# Patient Record
Sex: MALE | Race: WHITE | NOT HISPANIC OR LATINO | Employment: OTHER | URBAN - NONMETROPOLITAN AREA
[De-identification: names, ages, dates, MRNs, and addresses within clinical notes are randomized per-mention and may not be internally consistent; named-entity substitution may affect disease eponyms.]

---

## 2023-02-13 ENCOUNTER — TRANSCRIBE ORDERS (OUTPATIENT)
Dept: GENERAL RADIOLOGY | Facility: HOSPITAL | Age: 73
End: 2023-02-13
Payer: MEDICARE

## 2023-02-13 ENCOUNTER — HOSPITAL ENCOUNTER (OUTPATIENT)
Dept: GENERAL RADIOLOGY | Facility: HOSPITAL | Age: 73
Discharge: HOME | End: 2023-02-13
Admitting: INTERNAL MEDICINE
Payer: MEDICARE

## 2023-02-13 DIAGNOSIS — M79.651 RIGHT THIGH PAIN: Primary | ICD-10-CM

## 2023-02-13 DIAGNOSIS — M79.651 RIGHT THIGH PAIN: ICD-10-CM

## 2023-02-13 PROCEDURE — 73552 X-RAY EXAM OF FEMUR 2/>: CPT

## 2023-04-17 ENCOUNTER — OFFICE VISIT (OUTPATIENT)
Dept: ORTHOPEDIC SURGERY | Facility: CLINIC | Age: 73
End: 2023-04-17
Payer: MEDICARE

## 2023-04-17 VITALS
DIASTOLIC BLOOD PRESSURE: 84 MMHG | WEIGHT: 180.6 LBS | TEMPERATURE: 98 F | HEIGHT: 69 IN | SYSTOLIC BLOOD PRESSURE: 122 MMHG | BODY MASS INDEX: 26.75 KG/M2

## 2023-04-17 DIAGNOSIS — M16.11 PRIMARY OSTEOARTHRITIS OF RIGHT HIP: ICD-10-CM

## 2023-04-17 DIAGNOSIS — S76.311A STRAIN OF RIGHT HAMSTRING MUSCLE, INITIAL ENCOUNTER: Primary | ICD-10-CM

## 2023-04-17 RX ORDER — ATORVASTATIN CALCIUM 80 MG/1
80 TABLET, FILM COATED ORAL
COMMUNITY
Start: 2023-02-28

## 2023-04-17 RX ORDER — LISINOPRIL 10 MG/1
1.5 TABLET ORAL DAILY
COMMUNITY
Start: 2023-03-22

## 2023-04-17 NOTE — PROGRESS NOTES
Subjective   Patient ID: Dave Ruby is a 73 y.o. left hand dominant  male referred by Neftaly Wang MD and is being seen for orthopaedic evaluation today for right thigh hamstring pain and sports injury.  Pain of the Right Leg (Referred by Neftaly Wang MD for right thigh pain for about 7 months)       CHIEF COMPLAINT:    Right thigh pain    History of Present Illness    Right thigh muscle pain with reported posterior hamstring strain playing racquetball six months ago. He just started back playing [employer], he is wearing pressure wrap on thigh and feels this helps. 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 He wants to make sure his thigh is healthy enough to start training. He and his primary care provider were just concerned about the delayed healing and how long it was taking for pain to go away. He also plays golf regularly.  Past orthopaedic history notable for right knee arthroscopy in 1994 in [place].      Past Medical History:   Diagnosis Date   • Colon polyp 2017   • Hyperlipidemia    • Hypertension         Past Surgical History:   Procedure Laterality Date   • ACHILLES TENDON REPAIR Right 1996    Dr. Jesus Muhammad [place]   • CATARACT EXTRACTION Bilateral 05/2022   • COLONOSCOPY  2017   • INGUINAL HERNIA REPAIR Right     at 5 years old   • KNEE ARTHROSCOPY Right 1994    Dr. Jesus Muhammad [place]   • TONSILLECTOMY      childhood       Family History   Problem Relation Age of Onset   • Peripheral vascular disease Mother    • Heart failure Father    • Colon cancer Neg Hx         Social History     Socioeconomic History   • Marital status: Single   Tobacco Use   • Smoking status: Never   • Smokeless tobacco: Never   Vaping Use   • Vaping Use: Never used   Substance and Sexual Activity   • Alcohol use: Yes     Comment: SOCIAL   • Drug use: Never   • Sexual activity: Defer       No Known  "Allergies    Review of Systems   Constitutional: Negative for fever.   Musculoskeletal: Negative for arthralgias, gait problem and joint swelling.   Skin: Negative for color change and rash.   Neurological: Negative for weakness.   Psychiatric/Behavioral: Negative for confusion.     I have reviewed the medical and surgical history, family history, social history, medications, and/or allergies, and the review of systems of this report.    Objective   /84   Temp 98 °F (36.7 °C)   Ht 175.3 cm (69\")   Wt 81.9 kg (180 lb 9.6 oz)   BMI 26.67 kg/m²   Physical Exam  Vitals reviewed.   Constitutional:       General: He is not in acute distress.     Appearance: He is well-developed.   Musculoskeletal:      Right knee: No effusion.      Instability Tests: Medial Datne test negative.   Skin:     General: Skin is warm and dry.      Findings: No erythema or rash.   Neurological:      Mental Status: He is alert.      Gait: Gait is intact.   Psychiatric:         Speech: Speech normal.       Right Knee Exam     Tenderness   The patient is experiencing tenderness in the medial hamstring.    Range of Motion   Extension: 0   Flexion: 130     Tests   Dante:  Medial - negative   Varus: negative   Drawer:  Anterior - 1+    Posterior - 1+  Patellar apprehension: negative    Other   Erythema: absent  Sensation: normal  Pulse: present  Effusion: no effusion present      Right Hip Exam     Tenderness   The patient is experiencing no tenderness.     Range of Motion   Flexion: 110   External rotation: 30   Internal rotation: 20     Muscle Strength   Abduction: 5/5   Adduction: 5/5   Flexion: 5/5     Tests   PEYTON: negative  Fadir:  Negative FADIR test        Extremity DVT signs are negative on physical exam with negative Antonella sign and no calf pain   Neurologic Exam     Mental Status   Attention: normal.   Speech: speech is normal   Level of consciousness: alert  Knowledge: good.     Motor Exam   Overall muscle tone: " normal    Gait, Coordination, and Reflexes     Gait  Gait: normal       Assessment & Plan     Independent Review of Radiographic Studies:    Reviewed relevant prior imaging with patient again today.   Four views (2 AP and 2 lateral) of the right femur from 2- are reviewed.  No fracture or dislocation seen. There is moderate to advanced right hip osteoarthritis.    Laboratory and Other Studies:  No new results reviewed today.     Medical Decision Making:    Stable neurovascular exam.  Fair progress with residual symptoms of recovering right hamstring strain.   Continue care plan with any additional work-up and treatment as outlined in recommendations.  Physical and occupational therapy planned.  Gradual return to exercise and sports (racquetball, golf) activities as appropriate.    Procedures    Diagnoses and all orders for this visit:    1. Strain of right hamstring muscle, initial encounter (Primary)    2. Primary osteoarthritis of right hip       Discussion of orthopaedic goals and activities and patient expressed appreciation.  Risk, benefits, and merits of treatment alternatives reviewed with the patient and questions answered  Regular exercise as tolerated  Guided on proper techniques for mobility, strength, agility and/or conditioning exercises  Ice, heat, and/or modalities as beneficial  Home exercise program ongoing    Recommendations/Plan:  Exercise, medications, injections, other patient advice, and return appointment as noted.  Brace: No brace was given at today's visit.  Referral: No referrals made at today's visit.  Test/Studies: No additional studies ordered at this time. Consider MRI or other imaging depending on clinical progress.  Surgery: Plan non-surgical treatment for current orthopaedic condition.  Work/Activity Status: Usual activities, routine exercise as tolerated, routine physical work as tolerated.    No follow-ups on file.  Patient is encouraged and agreeable to call or return  sooner for any issues or concerns.

## 2023-04-26 ENCOUNTER — OFFICE VISIT (OUTPATIENT)
Dept: GASTROENTEROLOGY | Facility: CLINIC | Age: 73
End: 2023-04-26
Payer: MEDICARE

## 2023-04-26 VITALS
WEIGHT: 178 LBS | OXYGEN SATURATION: 99 % | BODY MASS INDEX: 26.29 KG/M2 | SYSTOLIC BLOOD PRESSURE: 138 MMHG | DIASTOLIC BLOOD PRESSURE: 80 MMHG | HEART RATE: 62 BPM

## 2023-04-26 DIAGNOSIS — Z86.010 PERSONAL HISTORY OF COLONIC POLYPS: Primary | ICD-10-CM

## 2023-04-26 PROBLEM — N53.9 MALE SEXUAL DYSFUNCTION: Status: ACTIVE | Noted: 2023-04-26

## 2023-04-26 PROBLEM — Z86.69 HISTORY OF CATARACT: Status: ACTIVE | Noted: 2023-04-26

## 2023-04-26 PROBLEM — E78.00 HYPERCHOLESTEROLEMIA: Status: ACTIVE | Noted: 2023-04-26

## 2023-04-26 PROBLEM — I10 ESSENTIAL HYPERTENSION: Status: ACTIVE | Noted: 2023-04-26

## 2023-04-26 PROBLEM — R13.10 DYSPHAGIA: Status: ACTIVE | Noted: 2023-04-26

## 2023-04-26 RX ORDER — SODIUM CHLORIDE 9 MG/ML
70 INJECTION, SOLUTION INTRAVENOUS CONTINUOUS PRN
OUTPATIENT
Start: 2023-04-26

## 2023-04-26 RX ORDER — PEG-3350, SODIUM SULFATE, SODIUM CHLORIDE, POTASSIUM CHLORIDE, SODIUM ASCORBATE AND ASCORBIC ACID 7.5-2.691G
1000 KIT ORAL TAKE AS DIRECTED
Qty: 1 EACH | Refills: 0 | Status: SHIPPED | OUTPATIENT
Start: 2023-04-26

## 2023-04-26 RX ORDER — UBIDECARENONE 100 MG
100 CAPSULE ORAL DAILY
COMMUNITY

## 2023-04-26 RX ORDER — NIACINAMIDE 500 MG
500 TABLET ORAL 2 TIMES DAILY WITH MEALS
COMMUNITY

## 2023-04-26 NOTE — PROGRESS NOTES
New Patient Consult      Date: 2023   Patient Name: Dave Ruby  MRN: [id number]  : 1950     Primary Care Provider: Neftaly Wang MD    Chief Complaint   Patient presents with   • Colon Cancer Screening     History of Present Illness: Dave Ruby is a 72 y.o. male who is here today to establish care with gastroenterology for colon cancer screening.     The patient denies recent change in bowel habits. There is no diarrhea or constipation. There is no history of abdominal pain. There is no history of overt GI bleed (hematemesis melena or hematochezia). The patient denies nausea or vomiting. There is no history of reflux. The patient denies dysphagia or odynophagia. There is no history of recent significant weight loss. There is no history of liver disease in the past. There is no family history of GI malignancy. The patient's last colonoscopy was in 2017 with polyps removed.     Subjective      Past Medical History:   Diagnosis Date   • Colon polyp    • Hyperlipidemia    • Hypertension      Past Surgical History:   Procedure Laterality Date   • ACHILLES TENDON REPAIR Right     Dr. Jesus Muhammad [place]   • CATARACT EXTRACTION Bilateral 2022   • COLONOSCOPY  2017   • INGUINAL HERNIA REPAIR Right     at 5 years old   • KNEE ARTHROSCOPY Right     Dr. Jesus Caldwell   • TONSILLECTOMY      childhood     Family History   Problem Relation Age of Onset   • Peripheral vascular disease Mother    • Heart failure Father    • Colon cancer Neg Hx         Social History     Socioeconomic History   • Marital status: Single   Tobacco Use   • Smoking status: Never   • Smokeless tobacco: Never   Vaping Use   • Vaping Use: Never used   Substance and Sexual Activity   • Alcohol use: Yes     Comment: SOCIAL   • Drug use: Never   • Sexual activity: Defer       Current Outpatient Medications:   •  atorvastatin (LIPITOR) 80 MG tablet, Take 1 tablet  by mouth every night at bedtime., Disp: , Rfl:   •  coenzyme Q10 100 MG capsule, Take 1 capsule by mouth Daily., Disp: , Rfl:   •  lisinopril (PRINIVIL,ZESTRIL) 10 MG tablet, Take 1.5 tablets by mouth Daily., Disp: , Rfl:   •  niacinamide 500 MG tablet, Take 1 tablet by mouth 2 (Two) Times a Day With Meals., Disp: , Rfl:   •  Selenium 100 MCG capsule, Take  by mouth., Disp: , Rfl:   •  PEG-KCl-NaCl-NaSulf-Na Asc-C (MOVIPREP) 100 g reconstituted solution powder, Take 1,000 mL by mouth Take As Directed. Take as directed for colonoscopy prep., Disp: 1 each, Rfl: 0     No Known Allergies     The following portions of the patient's history were reviewed and updated as appropriate: allergies, current medications, past family history, past medical history, past social history, past surgical history and problem list.    Objective     Physical Exam  Vitals and nursing note reviewed.   Constitutional:       General: He is not in acute distress.     Appearance: Normal appearance. He is well-developed.   HENT:      Head: Normocephalic and atraumatic.      Mouth/Throat:      Mouth: Mucous membranes are not pale, not dry and not cyanotic.   Eyes:      General: Lids are normal.   Neck:      Trachea: Trachea normal.   Cardiovascular:      Rate and Rhythm: Normal rate.   Pulmonary:      Effort: Pulmonary effort is normal. No respiratory distress.      Breath sounds: Normal breath sounds.   Abdominal:      Tenderness: There is no abdominal tenderness.   Skin:     General: Skin is warm and dry.   Neurological:      Mental Status: He is alert and oriented to person, place, and time.   Psychiatric:         Mood and Affect: Mood normal.         Speech: Speech normal.         Behavior: Behavior normal. Behavior is cooperative.       Vitals:    04/26/23 0900   BP: 138/80   Pulse: 62   SpO2: 99%   Weight: 80.7 kg (178 lb)     Body mass index is 26.29 kg/m².     Results Review:   I have reviewed the patient's new clinical and imaging  results.    No visits with results within 90 Day(s) from this visit.   Latest known visit with results is:   No results found for any previous visit.      XR Femur 2 View Right     Result Date: 2/13/2023  No acute fracture.  Mild right hip joint space narrowing.       Assessment / Plan      1. Personal history of colonic polyps  The patient's last colonoscopy was in 2017 by Dr. Saxena in [facility] with polyp removed.  There is no family history of colon cancer.  Colonoscopy for surveillance.    - Case Request  - PEG-KCl-NaCl-NaSulf-Na Asc-C (MOVIPREP) 100 g reconstituted solution powder; Take 1,000 mL by mouth Take As Directed. Take as directed for colonoscopy prep.  Dispense: 1 each; Refill: 0    Patient Instructions   1. Colonoscopy: The indications, technique, alternatives and potential risk and complications were discussed with the patient including but not limited to bleeding, perforations, missing lesions and anesthetic complications. The patient understands and wishes to proceed with the procedure and has given their verbal consent. Written patient education information was given to the patient.   2. The patient will call if they have further questions before procedure.       Gina Brennan, APRN  4/26/2023    Please note that portions of this note may have been completed with a voice recognition program.

## 2023-06-06 ENCOUNTER — TELEPHONE (OUTPATIENT)
Dept: GASTROENTEROLOGY | Facility: CLINIC | Age: 73
End: 2023-06-06
Payer: MEDICARE

## 2023-06-06 NOTE — TELEPHONE ENCOUNTER
Called patient re: confirmation of appt for procedure (colonoscopy) on 06/12/2023.  Left voicemail for patient to call back to confirm.  Confirmation can be left with the HUB.

## 2023-06-08 NOTE — PRE-PROCEDURE INSTRUCTIONS
PAT phone history completed with pt for upcoming procedure on      PAT PASS GIVEN/REVIEWED WITH PT.  VERBALIZED UNDERSTANDING OF THE FOLLOWING:  DO NOT EAT, DRINK, SMOKE, USE SMOKELESS TOBACCO OR CHEW GUM AFTER MIDNIGHT THE NIGHT BEFORE SURGERY.  THIS ALSO INCLUDES HARD CANDIES AND MINTS.    DO NOT SHAVE THE AREA TO BE OPERATED ON AT LEAST 48 HOURS PRIOR TO THE PROCEDURE.  DO NOT WEAR MAKE UP OR NAIL POLISH.  DO NOT LEAVE IN ANY PIERCING OR WEAR JEWELRY THE DAY OF SURGERY.      DO NOT USE ADHESIVES IF YOU WEAR DENTURES.    DO NOT WEAR EYE CONTACTS; BRING IN YOUR GLASSES.    ONLY TAKE MEDICATION THE MORNING OF YOUR PROCEDURE IF INSTRUCTED BY YOUR SURGEON WITH ENOUGH WATER TO SWALLOW THE MEDICATION.  IF YOUR SURGEON DID NOT SPECIFY WHICH MEDICATIONS TO TAKE, YOU WILL NEED TO CALL THEIR OFFICE FOR FURTHER INSTRUCTIONS AND DO AS THEY INSTRUCT.    LEAVE ANYTHING YOU CONSIDER VALUABLE AT HOME.    YOU WILL NEED TO ARRANGE FOR SOMEONE TO DRIVE YOU HOME AFTER SURGERY.  IT IS RECOMMENDED THAT YOU DO NOT DRIVE, WORK, DRINK ALCOHOL OR MAKE MAJOR DECISIONS FOR AT LEAST 24 HOURS AFTER YOUR PROCEDURE IS COMPLETE.      THE DAY OF YOUR PROCEDURE, BRING IN THE FOLLOWING IF APPLICABLE:   PICTURE ID AND INSURANCE/MEDICARE OR MEDICAID CARDS/ANY CO-PAY THAT MAY BE DUE   COPY OF ADVANCED DIRECTIVE/LIVING WILL/POWER OR    CPAP/BIPAP/INHALERS   SKIN PREP SHEET   YOUR PREADMISSION TESTING PASS (IF NOT A PHONE HISTORY)

## 2023-06-12 ENCOUNTER — HOSPITAL ENCOUNTER (OUTPATIENT)
Facility: HOSPITAL | Age: 73
Setting detail: HOSPITAL OUTPATIENT SURGERY
Discharge: HOME | End: 2023-06-12
Attending: INTERNAL MEDICINE | Admitting: INTERNAL MEDICINE
Payer: MEDICARE

## 2023-06-12 ENCOUNTER — ANESTHESIA (OUTPATIENT)
Dept: GASTROENTEROLOGY | Facility: HOSPITAL | Age: 73
End: 2023-06-12
Payer: MEDICARE

## 2023-06-12 ENCOUNTER — ANESTHESIA EVENT (OUTPATIENT)
Dept: GASTROENTEROLOGY | Facility: HOSPITAL | Age: 73
End: 2023-06-12
Payer: MEDICARE

## 2023-06-12 VITALS
HEART RATE: 54 BPM | RESPIRATION RATE: 18 BRPM | HEIGHT: 70 IN | BODY MASS INDEX: 25.48 KG/M2 | DIASTOLIC BLOOD PRESSURE: 72 MMHG | TEMPERATURE: 97.4 F | SYSTOLIC BLOOD PRESSURE: 156 MMHG | OXYGEN SATURATION: 97 % | WEIGHT: 178 LBS

## 2023-06-12 DIAGNOSIS — Z86.010 PERSONAL HISTORY OF COLONIC POLYPS: ICD-10-CM

## 2023-06-12 PROCEDURE — 25010000002 PROPOFOL 200 MG/20ML EMULSION: Performed by: NURSE ANESTHETIST, CERTIFIED REGISTERED

## 2023-06-12 PROCEDURE — 45385 COLONOSCOPY W/LESION REMOVAL: CPT | Performed by: INTERNAL MEDICINE

## 2023-06-12 RX ORDER — LIDOCAINE HYDROCHLORIDE 20 MG/ML
INJECTION, SOLUTION INTRAVENOUS AS NEEDED
Status: DISCONTINUED | OUTPATIENT
Start: 2023-06-12 | End: 2023-06-12 | Stop reason: SURG

## 2023-06-12 RX ORDER — SODIUM CHLORIDE 9 MG/ML
INJECTION, SOLUTION INTRAVENOUS CONTINUOUS PRN
Status: DISCONTINUED | OUTPATIENT
Start: 2023-06-12 | End: 2023-06-12 | Stop reason: SURG

## 2023-06-12 RX ORDER — SIMETHICONE 20 MG/.3ML
EMULSION ORAL AS NEEDED
Status: DISCONTINUED | OUTPATIENT
Start: 2023-06-12 | End: 2023-06-12 | Stop reason: HOSPADM

## 2023-06-12 RX ORDER — PROPOFOL 10 MG/ML
INJECTION, EMULSION INTRAVENOUS AS NEEDED
Status: DISCONTINUED | OUTPATIENT
Start: 2023-06-12 | End: 2023-06-12 | Stop reason: SURG

## 2023-06-12 RX ORDER — SODIUM CHLORIDE 9 MG/ML
70 INJECTION, SOLUTION INTRAVENOUS CONTINUOUS PRN
Status: DISCONTINUED | OUTPATIENT
Start: 2023-06-12 | End: 2023-06-12 | Stop reason: HOSPADM

## 2023-06-12 RX ADMIN — LIDOCAINE HYDROCHLORIDE 40 MG: 20 INJECTION, SOLUTION INTRAVENOUS at 11:03

## 2023-06-12 RX ADMIN — SODIUM CHLORIDE 70 ML/HR: 9 INJECTION, SOLUTION INTRAVENOUS at 10:44

## 2023-06-12 RX ADMIN — SODIUM CHLORIDE: 9 INJECTION, SOLUTION INTRAVENOUS at 10:56

## 2023-06-12 RX ADMIN — PROPOFOL 100 MG: 10 INJECTION, EMULSION INTRAVENOUS at 11:03

## 2023-06-12 RX ADMIN — PROPOFOL 50 MG: 10 INJECTION, EMULSION INTRAVENOUS at 11:15

## 2023-06-12 RX ADMIN — PROPOFOL 50 MG: 10 INJECTION, EMULSION INTRAVENOUS at 11:09

## 2023-06-12 RX ADMIN — PROPOFOL 50 MG: 10 INJECTION, EMULSION INTRAVENOUS at 11:21

## 2023-06-12 RX ADMIN — PROPOFOL 50 MG: 10 INJECTION, EMULSION INTRAVENOUS at 11:28

## 2023-06-12 NOTE — DISCHARGE INSTRUCTIONS
- Discharge patient to home (ambulatory).   - High fiber diet.   - Continue present medications.   - Await pathology results.   - Repeat colonoscopy in 1-3 year for surveillance pending path.   - Return to GI office in 8 weeks.

## 2023-06-12 NOTE — ANESTHESIA POSTPROCEDURE EVALUATION
Patient: Dave Ruby    Procedure Summary       Date: 06/12/23 Room / Location: [facility] 1 / [facility]    Anesthesia Start: 1056 Anesthesia Stop: 1135    Procedure: COLONOSCOPY with polypectomy (Anus) Diagnosis:       Personal history of colonic polyps      (Personal history of colonic polyps [Z86.010])    Surgeons: Williams Pierce MD Provider: Mak Austin CRNA    Anesthesia Type: MAC ASA Status: 2            Anesthesia Type: MAC    Vitals  Vitals Value Taken Time   /72 06/12/23 1210   Temp 97.4 °F (36.3 °C) 06/12/23 1142   Pulse 54 06/12/23 1210   Resp 18 06/12/23 1210   SpO2 97 % 06/12/23 1210           Post Anesthesia Care and Evaluation    Patient location during evaluation: PHASE II  Patient participation: complete - patient participated  Level of consciousness: awake and alert  Pain management: adequate    Airway patency: patent  Anesthetic complications: No anesthetic complications  PONV Status: none  Cardiovascular status: acceptable  Respiratory status: acceptable  Hydration status: acceptable  No anesthesia care post op

## 2023-06-12 NOTE — ANESTHESIA PREPROCEDURE EVALUATION
Anesthesia Evaluation     Patient summary reviewed and Nursing notes reviewed   NPO Solid Status: > 8 hours  NPO Liquid Status: > 8 hours           Airway   Mallampati: II  TM distance: >3 FB  Neck ROM: full  Possible difficult intubation  Dental - normal exam     Pulmonary - normal exam   (-) lung cancer  Cardiovascular - normal exam    (+) hypertensionhyperlipidemia      Neuro/Psych- negative ROS  GI/Hepatic/Renal/Endo - negative ROS     Musculoskeletal (-) negative ROS    Abdominal    Substance History   (+) alcohol use     OB/GYN          Other - negative ROS                       Anesthesia Plan    ASA 2     MAC     (Risks and benefits discussed including risk of aspiration, recall and dental damage. All patient questions answered.    Will continue with plan of care.)  intravenous induction     Anesthetic plan, risks, benefits, and alternatives have been provided, discussed and informed consent has been obtained with: patient.  Pre-procedure education provided  Plan discussed with CRNA.    CODE STATUS:

## 2023-06-12 NOTE — H&P
[facility]  HISTORY AND PHYSICAL    Patient Name: Dave Ruby  : 1950  MRN: [id number]    Chief Complaint:   For  surveillance colonoscopy    History Of Presenting Illness:    H/o colon polyps 2017    Past Medical History:   Diagnosis Date    Cataracts, bilateral     Colon polyp 2017    Enlarged prostate     Hard of hearing     Hyperlipidemia     Hypertension     Seasonal allergies     Tinnitus        Past Surgical History:   Procedure Laterality Date    ACHILLES TENDON REPAIR Right     Dr. Jesus Muhammad [place]    CATARACT EXTRACTION Bilateral 2022    COLONOSCOPY  2017    INGUINAL HERNIA REPAIR Right     at 5 years old    KNEE ARTHROSCOPY Right     Dr. Jesus Muhammad [place]    TONSILLECTOMY      childhood       Social History     Socioeconomic History    Marital status: Single   Tobacco Use    Smoking status: Never    Smokeless tobacco: Never   Vaping Use    Vaping Use: Never used   Substance and Sexual Activity    Alcohol use: Yes     Comment: SOCIAL    Drug use: Never    Sexual activity: Defer       Family History   Problem Relation Age of Onset    Peripheral vascular disease Mother     Heart failure Father     Colon cancer Neg Hx        Prior to Admission Medications:  Medications Prior to Admission   Medication Sig Dispense Refill Last Dose    atorvastatin (LIPITOR) 80 MG tablet Take 1 tablet by mouth every night at bedtime.   2023    coenzyme Q10 100 MG capsule Take 1 capsule by mouth Daily.   2023    lisinopril (PRINIVIL,ZESTRIL) 10 MG tablet Take 1.5 tablets by mouth Daily.   2023    PEG-KCl-NaCl-NaSulf-Na Asc-C (MOVIPREP) 100 g reconstituted solution powder Take 1,000 mL by mouth Take As Directed. Take as directed for colonoscopy prep. 1 each 0 2023    Selenium 100 MCG capsule Take  by mouth Daily.   2023       Allergies:  No Known Allergies     Vitals: Temp:  [97.2 °F (36.2 °C)] 97.2 °F (36.2 °C)  Heart Rate:  [59]  59  Resp:  [18] 18  BP: (136)/(92) 136/92    Review Of Systems:  Constitutional:  Negative for chills, fever, and unexpected weight change.  Respiratory:  Negative for cough, chest tightness, shortness of breath, and wheezing.  Cardiovascular:  Negative for chest pain, palpitations, and leg swelling.  Gastrointestinal:  Negative for abdominal distention, abdominal pain, nausea, vomiting.  Neurological:  Negative for weakness, numbness, and headaches.     Physical Exam:    General Appearance:  Alert, cooperative, in no acute distress.   Lungs:   Clear to auscultation, respirations regular, even and                 unlabored.   Heart:  Regular rhythm and normal rate.   Abdomen:   Normal bowel sounds, no masses, no organomegaly. Soft, nontender, nondistended   Neurologic: Alert and oriented x 3. Moves all four limbs equally       Assessment & Plan     Assessment:  Principal Problem:    History of colonic polyps      Plan: COLONOSCOPY (N/A)     Williams Pierce MD  6/12/2023

## 2023-06-13 LAB — REF LAB TEST METHOD: NORMAL

## 2023-08-21 ENCOUNTER — OFFICE VISIT (OUTPATIENT)
Dept: UROLOGY | Facility: CLINIC | Age: 73
End: 2023-08-21
Payer: MEDICARE

## 2023-08-21 ENCOUNTER — LAB (OUTPATIENT)
Dept: LAB | Facility: HOSPITAL | Age: 73
End: 2023-08-21
Payer: MEDICARE

## 2023-08-21 VITALS — HEIGHT: 70 IN | BODY MASS INDEX: 25.48 KG/M2 | WEIGHT: 178 LBS

## 2023-08-21 DIAGNOSIS — R97.20 ELEVATED PSA: Primary | ICD-10-CM

## 2023-08-21 DIAGNOSIS — R39.9 LOWER URINARY TRACT SYMPTOMS (LUTS): ICD-10-CM

## 2023-08-21 DIAGNOSIS — R97.20 ELEVATED PSA: ICD-10-CM

## 2023-08-21 PROCEDURE — 84154 ASSAY OF PSA FREE: CPT

## 2023-08-21 PROCEDURE — 84153 ASSAY OF PSA TOTAL: CPT

## 2023-08-21 PROCEDURE — 36415 COLL VENOUS BLD VENIPUNCTURE: CPT

## 2023-08-21 NOTE — PROGRESS NOTES
Chief Complaint  Elevated PSA    Referring Provider  Neftaly Wang*    HPI  Mr. Ruby is a 73 y.o.  male who presents with an elevated PSA to 8.2 in July. He then took 10 days of antibiotics. He doesn't know which one. He says he had dysuria and frequency at the time and was told he had an infection.       Patient denies dysuria and hematuria.  Takes tamsulosin for chronic LUTS; most bothered by nocturia every 1-2 hours.     He denies shortness of breath, leg swelling, calf pain or bone pain.    Past Medical History  Past Medical History:   Diagnosis Date    Cataracts, bilateral     Colon polyp 2017    Enlarged prostate     Hard of hearing     Hyperlipidemia     Hypertension     Seasonal allergies     Tinnitus        Past Surgical History  Past Surgical History:   Procedure Laterality Date    ACHILLES TENDON REPAIR Right 1996    Dr. Jesus Muhammad [place]    CATARACT EXTRACTION Bilateral 05/2022    COLONOSCOPY  2017    COLONOSCOPY N/A 6/12/2023    Procedure: COLONOSCOPY with polypectomy;  Surgeon: Williams Pierce MD;  Location: [facility] ENDOSCOPY;  Service: Gastroenterology;  Laterality: N/A;    INGUINAL HERNIA REPAIR Right     at 5 years old    KNEE ARTHROSCOPY Right 1994    Dr. Jesus Muhammad [place]    TONSILLECTOMY      childhood       Medications    Current Outpatient Medications:     atorvastatin (LIPITOR) 80 MG tablet, Take 1 tablet by mouth every night at bedtime., Disp: , Rfl:     coenzyme Q10 100 MG capsule, Take 1 capsule by mouth Daily., Disp: , Rfl:     lisinopril (PRINIVIL,ZESTRIL) 10 MG tablet, Take 1.5 tablets by mouth Daily., Disp: , Rfl:     Selenium 100 MCG capsule, Take  by mouth Daily., Disp: , Rfl:     Allergies  No Known Allergies    Social History  Social History     Tobacco Use    Smoking status: Never    Smokeless tobacco: Never   Vaping Use    Vaping Use: Never used   Substance Use Topics    Alcohol use: Yes     Comment: SOCIAL    Drug use: Never  "      Family History  Family History   Problem Relation Age of Onset    Peripheral vascular disease Mother     Heart failure Father     Colon cancer Neg Hx       He has no family history of prostate cancer.      Physical Exam  Visit Vitals  Ht 176.5 cm (69.5\")   Wt 80.7 kg (178 lb)   BMI 25.91 kg/mý     A physical exam was notable for normal habitus.    Genitourinary  Rectal:  Normal tone, no masses.  Prostate:  40 grams.  Symmetric, non-tender, anodular and no induration.      Labs  Brief Urine Lab Results       None        PSA was 8.2 on 7/20/2023.  Says PSA was checked in past and normal he reports    No results found for: PSA      Assessment  Mr. Ruby is a 73 y.o.  male who presents with elevated PSA.  This is a new diagnosis of uncertain clinical prognosis. Likely 2/2 to infection. Denies long term LUTS, but on further questioning has significant nocturia.     Plan  1.  PSA today total and free  2.  FU in 1-2 weeks for BPH workup. Risks are Hx of possible UTI    "

## 2023-08-22 LAB
PSA FREE MFR SERPL: 27.5 %
PSA FREE SERPL-MCNC: 0.88 NG/ML
PSA SERPL-MCNC: 3.2 NG/ML (ref 0–4)

## 2023-09-20 ENCOUNTER — OFFICE VISIT (OUTPATIENT)
Dept: GASTROENTEROLOGY | Facility: CLINIC | Age: 73
End: 2023-09-20
Payer: MEDICARE

## 2023-09-20 VITALS
WEIGHT: 167 LBS | DIASTOLIC BLOOD PRESSURE: 76 MMHG | OXYGEN SATURATION: 98 % | HEIGHT: 69 IN | HEART RATE: 61 BPM | TEMPERATURE: 98.5 F | SYSTOLIC BLOOD PRESSURE: 122 MMHG | BODY MASS INDEX: 24.73 KG/M2 | RESPIRATION RATE: 12 BRPM

## 2023-09-20 DIAGNOSIS — D12.6 ADENOMATOUS POLYP OF COLON, UNSPECIFIED PART OF COLON: Primary | ICD-10-CM

## 2023-09-20 DIAGNOSIS — K64.4 EXTERNAL HEMORRHOID: ICD-10-CM

## 2023-09-20 PROCEDURE — 1160F RVW MEDS BY RX/DR IN RCRD: CPT | Performed by: NURSE PRACTITIONER

## 2023-09-20 PROCEDURE — 3078F DIAST BP <80 MM HG: CPT | Performed by: NURSE PRACTITIONER

## 2023-09-20 PROCEDURE — 99213 OFFICE O/P EST LOW 20 MIN: CPT | Performed by: NURSE PRACTITIONER

## 2023-09-20 PROCEDURE — 3074F SYST BP LT 130 MM HG: CPT | Performed by: NURSE PRACTITIONER

## 2023-09-20 PROCEDURE — 1159F MED LIST DOCD IN RCRD: CPT | Performed by: NURSE PRACTITIONER

## 2023-09-20 RX ORDER — TAMSULOSIN HYDROCHLORIDE 0.4 MG/1
1 CAPSULE ORAL EVERY EVENING
COMMUNITY

## 2023-09-20 NOTE — PATIENT INSTRUCTIONS
High-fiber, low-fat diet with liberal water intake.  Colonoscopy for surveillance in 1 year, June 2024  Follow up: April 2024 to schedule follow up colonoscopy

## 2023-09-20 NOTE — PROGRESS NOTES
Follow Up Note     Date: 2023   Patient Name: Dave Ruby  MRN: [id number]  : 1950     Primary Care Provider: Neftaly Wang MD     Chief Complaint   Patient presents with    Follow-up     After procedure     History of present illness:   2023  Dave Ruby is a 73 y.o. male who is here today for follow up after colonoscopy. He did not have any problems after his colonoscopy. He denies any GI problems at this time. No GI bleeding.     Interval History:  2023  The patient denies recent change in bowel habits. There is no diarrhea or constipation. There is no history of abdominal pain. There is no history of overt GI bleed (hematemesis melena or hematochezia). The patient denies nausea or vomiting. There is no history of reflux. The patient denies dysphagia or odynophagia. There is no history of recent significant weight loss. There is no history of liver disease in the past. There is no family history of GI malignancy. The patient's last colonoscopy was in  with polyps removed.     Subjective      Past Medical History:   Diagnosis Date    Cataracts, bilateral     Colon polyp 2017    Enlarged prostate     Hard of hearing     Hyperlipidemia     Hypertension     Seasonal allergies     Tinnitus      Past Surgical History:   Procedure Laterality Date    ACHILLES TENDON REPAIR Right     Dr. Jesus Caldwell    CATARACT EXTRACTION Bilateral 2022    COLONOSCOPY  2017    COLONOSCOPY N/A 2023    Procedure: COLONOSCOPY with polypectomy;  Surgeon: Williams Pierce MD;  Location: [facility] ENDOSCOPY;  Service: Gastroenterology;  Laterality: N/A;    INGUINAL HERNIA REPAIR Right     at 5 years old    KNEE ARTHROSCOPY Right     Dr. Jesus Muhammad [place]    TONSILLECTOMY      childhood     Family History   Problem Relation Age of Onset    Peripheral vascular disease Mother     Heart failure Father     Colon cancer Neg Hx       Social History     Socioeconomic History    Marital status: Single   Tobacco Use    Smoking status: Never    Smokeless tobacco: Never   Vaping Use    Vaping Use: Never used   Substance and Sexual Activity    Alcohol use: Yes     Comment: SOCIAL    Drug use: Never    Sexual activity: Defer       Current Outpatient Medications:     atorvastatin (LIPITOR) 80 MG tablet, Take 1 tablet by mouth every night at bedtime., Disp: , Rfl:     coenzyme Q10 100 MG capsule, Take 1 capsule by mouth Daily., Disp: , Rfl:     lisinopril (PRINIVIL,ZESTRIL) 10 MG tablet, Take 1.5 tablets by mouth Daily., Disp: , Rfl:     Selenium 100 MCG capsule, Take  by mouth Daily., Disp: , Rfl:     tamsulosin (FLOMAX) 0.4 MG capsule 24 hr capsule, Take 1 capsule by mouth Every Evening., Disp: , Rfl:     No Known Allergies    The following portions of the patient's history were reviewed and updated as appropriate: allergies, current medications, past family history, past medical history, past social history, past surgical history and problem list.  Objective     Physical Exam  Vitals and nursing note reviewed.   Constitutional:       General: He is not in acute distress.     Appearance: Normal appearance. He is well-developed.   HENT:      Head: Normocephalic and atraumatic.      Mouth/Throat:      Mouth: Mucous membranes are not pale, not dry and not cyanotic.   Eyes:      General: Lids are normal.   Neck:      Trachea: Trachea normal.   Cardiovascular:      Rate and Rhythm: Normal rate.   Pulmonary:      Effort: Pulmonary effort is normal. No respiratory distress.      Breath sounds: Normal breath sounds.   Abdominal:      Tenderness: There is no abdominal tenderness.   Skin:     General: Skin is warm and dry.   Neurological:      Mental Status: He is alert and oriented to person, place, and time.   Psychiatric:         Mood and Affect: Mood normal.         Speech: Speech normal.         Behavior: Behavior normal. Behavior is cooperative.  "    Vitals:    09/20/23 1330   BP: 122/76   Pulse: 61   Resp: 12   Temp: 98.5 °F (36.9 °C)   SpO2: 98%   Weight: 75.8 kg (167 lb)   Height: 175.3 cm (69\")     Body mass index is 24.66 kg/m².     Results Review:   I reviewed the patient's new clinical results.    Lab on 08/21/2023   Component Date Value Ref Range Status    PSA 08/21/2023 3.2  0.0 - 4.0 ng/mL Final    Roche ECLIA methodology.  According to the American Urological Association, Serum PSA should  decrease and remain at undetectable levels after radical  prostatectomy. The AUA defines biochemical recurrence as an initial  PSA value 0.2 ng/mL or greater followed by a subsequent confirmatory  PSA value 0.2 ng/mL or greater.  Values obtained with different assay methods or kits cannot be used  interchangeably. Results cannot be interpreted as absolute evidence  of the presence or absence of malignant disease.    PSA, Free 08/21/2023 0.88  N/A ng/mL Final    Roche ECLIA methodology.    PSA, Free % 08/21/2023 27.5  % Final    The table below lists the probability of prostate cancer for  men with non-suspicious MICHAEL results and total PSA between  4 and 10 ng/mL, by patient age (Dulce Maria et al, EDELMIRA 1998,  279:1542).                    % Free PSA       50-64 yr        65-75 yr                    0.00-10.00%        56%             55%                   10.01-15.00%        24%             35%                   15.01-20.00%        17%             23%                   20.01-25.00%        10%             20%                        >25.00%         5%              9%  Please note:  Dulce Maria et al did not make specific                recommendations regarding the use of                percent free PSA for any other population                of men.      No radiology results for the last 90 days.     Colonoscopy dated 6/12/2023 per Dr. Pierce  - Preparation of the colon was fair.  - Sub optimal rt colon prep  - One 6 mm polyp in the cecum, removed with a cold snare. " Resected and retrieved.  - Two 3-4 mm polyps in the cecum, removed with a cold snare. Resected and retrieved.  - Three 3 to 4mm polyps in the proximal ascending colon, in the mid ascending colon and in the distal ascending colon, removed with a cold snare. Resected and retrieved.  - One 8 mm polyp in the transverse colon, removed with a hot snare. Resected and retrieved.  - Three 4 to 5 mm polyps in the proximal transverse colon and in the mid transverse colon, removed with a hot snare. Resected and retrieved.  - One 3 mm polyp in the proximal descending colon, removed with a cold snare. Resected and retrieved.  - One 6 to 8 mm polyp in the proximal descending colon, removed with a hot snare. Resected and retrieved.  - One 4 mm polyp in the sigmoid colon, removed with a cold snare. Resected and retrieved.  - Non-bleeding external hemorrhoids.  A.   SIGMOID COLON POLYP:  Digestive debris.  No viable tissue for histologic evaluation.  B.   CECUM POLYP, X3:  Multiple portions of tubular adenoma(s); negative for high-grade dysplasia.  C.   ASCENDING COLON POLYP, X3:  Multiple portions of tubular adenoma(s); negative for high-grade dysplasia.  D.   TRANSVERSE COLON POLYP, X4:  Tubular adenoma; negative for high-grade dysplasia.  E.   DESCENDING COLON POLYP, X2:  Multiple portions of tubular adenoma(s); negative for high-grade dysplasia.  Hyperplastic polyp.  F.   RECTAL POLYP:  Hyperplastic polyp.     Assessment / Plan      1. Adenomatous polyp of colon, unspecified part of colon  2. External hemorrhoid  Colonoscopy dated 6/12/2023 with multiple tubular adenomas removed, >10, no dysplasia.  Nonbleeding external hemorrhoids noted.  Suboptimal right colon prep.  There is no family history of colon cancer.   High-fiber, low-fat diet with liberal water intake.  Colonoscopy for surveillance in 1 year, June 2024, due to multiple tubular adenomas >10, and suboptimal prep of the right colon.    Patient Instructions   High-fiber,  low-fat diet with liberal water intake.  Colonoscopy for surveillance in 1 year, June 2024  Follow up: April 2024 to schedule follow up colonoscopy  Gina Brennan, APRN  9/20/2023    Please note that portions of this note were completed with a voice recognition program.

## 2023-10-30 ENCOUNTER — TELEPHONE (OUTPATIENT)
Dept: UROLOGY | Facility: CLINIC | Age: 73
End: 2023-10-30

## 2023-10-30 NOTE — TELEPHONE ENCOUNTER
Caller: Dave Ruby    Relationship to patient: Self    Patient is needing: CANCEL CYSTO, WILL BE OUT OF TOWN

## 2023-12-01 ENCOUNTER — HOSPITAL ENCOUNTER (EMERGENCY)
Facility: HOSPITAL | Age: 73
Discharge: HOME | End: 2023-12-01
Attending: EMERGENCY MEDICINE
Payer: MEDICARE

## 2023-12-01 VITALS
WEIGHT: 167 LBS | SYSTOLIC BLOOD PRESSURE: 170 MMHG | BODY MASS INDEX: 24.73 KG/M2 | DIASTOLIC BLOOD PRESSURE: 84 MMHG | RESPIRATION RATE: 18 BRPM | HEIGHT: 69 IN | HEART RATE: 66 BPM | OXYGEN SATURATION: 96 % | TEMPERATURE: 96.6 F

## 2023-12-01 DIAGNOSIS — R11.2 NAUSEA AND VOMITING, UNSPECIFIED VOMITING TYPE: ICD-10-CM

## 2023-12-01 DIAGNOSIS — R55 NEAR SYNCOPE: Primary | ICD-10-CM

## 2023-12-01 LAB
ALBUMIN SERPL-MCNC: 4 G/DL (ref 3.5–5.2)
ALBUMIN/GLOB SERPL: 1.5 G/DL
ALP SERPL-CCNC: 70 U/L (ref 39–117)
ALT SERPL W P-5'-P-CCNC: 35 U/L (ref 1–41)
ANION GAP SERPL CALCULATED.3IONS-SCNC: 12.4 MMOL/L (ref 5–15)
AST SERPL-CCNC: 23 U/L (ref 1–40)
BASOPHILS # BLD AUTO: 0.04 10*3/MM3 (ref 0–0.2)
BASOPHILS NFR BLD AUTO: 0.4 % (ref 0–1.5)
BILIRUB SERPL-MCNC: 0.3 MG/DL (ref 0–1.2)
BUN SERPL-MCNC: 22 MG/DL (ref 8–23)
BUN/CREAT SERPL: 21.6 (ref 7–25)
CALCIUM SPEC-SCNC: 9.6 MG/DL (ref 8.6–10.5)
CHLORIDE SERPL-SCNC: 102 MMOL/L (ref 98–107)
CO2 SERPL-SCNC: 22.6 MMOL/L (ref 22–29)
CREAT SERPL-MCNC: 1.02 MG/DL (ref 0.76–1.27)
DEPRECATED RDW RBC AUTO: 48 FL (ref 37–54)
EGFRCR SERPLBLD CKD-EPI 2021: 78.1 ML/MIN/1.73
EOSINOPHIL # BLD AUTO: 0.27 10*3/MM3 (ref 0–0.4)
EOSINOPHIL NFR BLD AUTO: 3 % (ref 0.3–6.2)
ERYTHROCYTE [DISTWIDTH] IN BLOOD BY AUTOMATED COUNT: 13.5 % (ref 12.3–15.4)
FLUAV SUBTYP SPEC NAA+PROBE: NOT DETECTED
FLUBV RNA ISLT QL NAA+PROBE: NOT DETECTED
GLOBULIN UR ELPH-MCNC: 2.6 GM/DL
GLUCOSE SERPL-MCNC: 168 MG/DL (ref 65–99)
HCT VFR BLD AUTO: 41.8 % (ref 37.5–51)
HGB BLD-MCNC: 14.4 G/DL (ref 13–17.7)
HOLD SPECIMEN: NORMAL
HOLD SPECIMEN: NORMAL
IMM GRANULOCYTES # BLD AUTO: 0.04 10*3/MM3 (ref 0–0.05)
IMM GRANULOCYTES NFR BLD AUTO: 0.4 % (ref 0–0.5)
LYMPHOCYTES # BLD AUTO: 3.77 10*3/MM3 (ref 0.7–3.1)
LYMPHOCYTES NFR BLD AUTO: 42.2 % (ref 19.6–45.3)
MAGNESIUM SERPL-MCNC: 2 MG/DL (ref 1.6–2.4)
MCH RBC QN AUTO: 33 PG (ref 26.6–33)
MCHC RBC AUTO-ENTMCNC: 34.4 G/DL (ref 31.5–35.7)
MCV RBC AUTO: 95.9 FL (ref 79–97)
MONOCYTES # BLD AUTO: 0.85 10*3/MM3 (ref 0.1–0.9)
MONOCYTES NFR BLD AUTO: 9.5 % (ref 5–12)
NEUTROPHILS NFR BLD AUTO: 3.96 10*3/MM3 (ref 1.7–7)
NEUTROPHILS NFR BLD AUTO: 44.5 % (ref 42.7–76)
NRBC BLD AUTO-RTO: 0 /100 WBC (ref 0–0.2)
PLATELET # BLD AUTO: 204 10*3/MM3 (ref 140–450)
PMV BLD AUTO: 8.8 FL (ref 6–12)
POTASSIUM SERPL-SCNC: 4.1 MMOL/L (ref 3.5–5.2)
PROT SERPL-MCNC: 6.6 G/DL (ref 6–8.5)
RBC # BLD AUTO: 4.36 10*6/MM3 (ref 4.14–5.8)
SARS-COV-2 RNA RESP QL NAA+PROBE: NOT DETECTED
SODIUM SERPL-SCNC: 137 MMOL/L (ref 136–145)
TROPONIN T SERPL HS-MCNC: 12 NG/L
WBC NRBC COR # BLD AUTO: 8.93 10*3/MM3 (ref 3.4–10.8)
WHOLE BLOOD HOLD COAG: NORMAL
WHOLE BLOOD HOLD SPECIMEN: NORMAL

## 2023-12-01 PROCEDURE — 93005 ELECTROCARDIOGRAM TRACING: CPT

## 2023-12-01 PROCEDURE — 84484 ASSAY OF TROPONIN QUANT: CPT | Performed by: EMERGENCY MEDICINE

## 2023-12-01 PROCEDURE — 93005 ELECTROCARDIOGRAM TRACING: CPT | Performed by: EMERGENCY MEDICINE

## 2023-12-01 PROCEDURE — 99284 EMERGENCY DEPT VISIT MOD MDM: CPT

## 2023-12-01 PROCEDURE — 85025 COMPLETE CBC W/AUTO DIFF WBC: CPT

## 2023-12-01 PROCEDURE — 25810000003 SODIUM CHLORIDE 0.9 % SOLUTION: Performed by: EMERGENCY MEDICINE

## 2023-12-01 PROCEDURE — 63710000001 ONDANSETRON ODT 4 MG TABLET DISPERSIBLE

## 2023-12-01 PROCEDURE — 87636 SARSCOV2 & INF A&B AMP PRB: CPT | Performed by: EMERGENCY MEDICINE

## 2023-12-01 PROCEDURE — 83735 ASSAY OF MAGNESIUM: CPT | Performed by: EMERGENCY MEDICINE

## 2023-12-01 PROCEDURE — 80053 COMPREHEN METABOLIC PANEL: CPT | Performed by: EMERGENCY MEDICINE

## 2023-12-01 RX ORDER — SODIUM CHLORIDE 0.9 % (FLUSH) 0.9 %
10 SYRINGE (ML) INJECTION AS NEEDED
Status: DISCONTINUED | OUTPATIENT
Start: 2023-12-01 | End: 2023-12-01 | Stop reason: HOSPADM

## 2023-12-01 RX ORDER — ONDANSETRON 4 MG/1
4 TABLET, ORALLY DISINTEGRATING ORAL ONCE
Status: COMPLETED | OUTPATIENT
Start: 2023-12-01 | End: 2023-12-01

## 2023-12-01 RX ORDER — ONDANSETRON 4 MG/1
4 TABLET, ORALLY DISINTEGRATING ORAL EVERY 8 HOURS PRN
Qty: 12 TABLET | Refills: 0 | Status: SHIPPED | OUTPATIENT
Start: 2023-12-01

## 2023-12-01 RX ADMIN — SODIUM CHLORIDE 1000 ML: 9 INJECTION, SOLUTION INTRAVENOUS at 04:20

## 2023-12-01 RX ADMIN — ONDANSETRON 4 MG: 4 TABLET, ORALLY DISINTEGRATING ORAL at 04:05

## 2023-12-19 NOTE — ED PROVIDER NOTES
"Subjective  History of Present Illness:    Chief Complaint: General weakness passed out    History of Present Illness: 73-year-old male presents with getting up from sleeping well to the bathroom, after use the bathroom he tried to stand and had a syncopal episode.  Reported nausea    Nurses Notes reviewed and agree, including vitals, allergies, social history and prior medical history.     REVIEW OF SYSTEMS: All systems reviewed and not pertinent unless noted.  Review of Systems      Positive for: General weakness, passed out, nausea    Negative for: Fever cough chest pain palpitations edema    Past Medical History:   Diagnosis Date    Cataracts, bilateral     Colon polyp 2017    Enlarged prostate     Hard of hearing     Hyperlipidemia     Hypertension     Seasonal allergies     Tinnitus        Allergies:    Patient has no known allergies.      Past Surgical History:   Procedure Laterality Date    ACHILLES TENDON REPAIR Right 1996    Dr. Jesus Muhammad [place]    CATARACT EXTRACTION Bilateral 05/2022    COLONOSCOPY  2017    COLONOSCOPY N/A 6/12/2023    Procedure: COLONOSCOPY with polypectomy;  Surgeon: Williams Pierce MD;  Location: [facility] ENDOSCOPY;  Service: Gastroenterology;  Laterality: N/A;    INGUINAL HERNIA REPAIR Right     at 5 years old    KNEE ARTHROSCOPY Right 1994    Dr. Jesus Muhammad [place]    TONSILLECTOMY      childhood         Social History     Socioeconomic History    Marital status: Single   Tobacco Use    Smoking status: Never    Smokeless tobacco: Never   Vaping Use    Vaping Use: Never used   Substance and Sexual Activity    Alcohol use: Yes     Comment: SOCIAL    Drug use: Never    Sexual activity: Defer         Family History   Problem Relation Age of Onset    Peripheral vascular disease Mother     Heart failure Father     Colon cancer Neg Hx        Objective  Physical Exam:  /84   Pulse 66   Temp 96.6 °F (35.9 °C)   Resp 18   Ht 175.3 cm (69\")   Wt 75.8 kg " (167 lb)   SpO2 96%   BMI 24.66 kg/m²      Physical Exam    CONSTITUTIONAL: Well developed, healthy-appearing nontoxic 73-year-old male,  in no acute distress.  No outward signs of trauma  VITAL SIGNS: per nursing, reviewed and noted  SKIN: exposed skin with no rashes, ulcerations or petechiae  EYES: Grossly EOMI, no icterus  ENT: Normal voice.  Moist mucous membranes   RESPIRATORY:  No increased work of breathing. No retractions.  Chest clear equal bilateral breath sounds  CARDIOVASCULAR:   Extremities pink and warm.  Good cap refill to extremities.  Regular rate and rhythm GI: Abdomen without distention   MUSCULOSKELETAL: Age-appropriate bulk and tone, moves all 4 extremities  NEUROLOGIC: Alert, oriented x 3. No gross deficits. GCS 15.   PSYCH: appropriate affect.  : no bladder tenderness or distention, no CVA tenderness    Procedures    ED Course:    Lab Results (last 24 hours)       ** No results found for the last 24 hours. **             No radiology results from the last 24 hrs     MDM     Amount and/or Complexity of Data Reviewed  Clinical lab tests: reviewed  Tests in the medicine section of CPT®: reviewed             Medical Decision Making:    Initial impression of presenting illness:  73-year-old male presents with getting up from sleeping well to the bathroom, after use the bathroom he tried to stand and had a syncopal episode.  Reported nausea      DDX: includes but is not limited to: Vasovagal syncope, dehydration electrolyte derangement, ACS, among others         Patient arrives hypertensive afebrile nontachycardic sats 96% room air with vitals interpreted by myself.     Pertinent features from physical exam: Benign exam neuro intact.    Initial diagnostic plan: EKG cardiac monitoring flu COVID CBC CMP troponin magnesium    Results from initial plan were reviewed and interpreted by me revealing EKG was sinus bradycardia, no ectopy no ischemic changes for interpretation nonactionable CBC CMP flu  and COVID, troponin, magnesium    Diagnostic information from other sources: Family member at the bedside    Interventions / Re-evaluation: IV fluids, Zofran    Results/clinical rationale were discussed with patient family    Consultations/Discussion of results with other physicians: None    Disposition plan: Patient was discharged in stable condition.  Counseled on supportive care, outpatient follow-up. Return precaution discussed.  Patient/family was understanding and agreeable with plan  Prescriptions Zofran  -----      Final diagnoses:   Near syncope   Nausea and vomiting, unspecified vomiting type            Nilo Carrillo, DO  12/18/23 1943

## 2024-06-19 ENCOUNTER — OFFICE VISIT (OUTPATIENT)
Dept: GASTROENTEROLOGY | Facility: CLINIC | Age: 74
End: 2024-06-19
Payer: MEDICARE

## 2024-06-19 VITALS
HEIGHT: 69 IN | WEIGHT: 165 LBS | RESPIRATION RATE: 12 BRPM | SYSTOLIC BLOOD PRESSURE: 138 MMHG | BODY MASS INDEX: 24.44 KG/M2 | HEART RATE: 50 BPM | DIASTOLIC BLOOD PRESSURE: 90 MMHG | OXYGEN SATURATION: 98 %

## 2024-06-19 DIAGNOSIS — Z86.010 PERSONAL HISTORY OF COLONIC POLYPS: Primary | ICD-10-CM

## 2024-06-19 RX ORDER — BISACODYL 5 MG/1
TABLET, DELAYED RELEASE ORAL
Qty: 4 TABLET | Refills: 0 | Status: SHIPPED | OUTPATIENT
Start: 2024-06-19

## 2024-06-19 RX ORDER — SODIUM CHLORIDE 9 MG/ML
70 INJECTION, SOLUTION INTRAVENOUS CONTINUOUS PRN
OUTPATIENT
Start: 2024-06-19

## 2024-06-19 RX ORDER — POLYETHYLENE GLYCOL 3350, SODIUM SULFATE, SODIUM CHLORIDE, POTASSIUM CHLORIDE, ASCORBIC ACID, SODIUM ASCORBATE 140-9-5.2G
1 KIT ORAL ONCE
Qty: 1 EACH | Refills: 0 | COMMUNITY
Start: 2024-06-19 | End: 2024-06-19

## 2024-06-19 NOTE — PROGRESS NOTES
Follow Up Note     Date: 2024   Patient Name: Dave Ruby  MRN: [id number]  : 1950     Primary Care Provider: Neftaly Wang MD     Chief Complaint   Patient presents with    Adenomatous polyp of colon     History of present illness:   2024  Dave Ruby is a 74 y.o. male who is here today for follow up for history of polyps. He needs to schedule his 1 year colonoscopy. He is not having any GI problems at this time.  No history of GI bleeding.     Interval History:  2023  Dave Ruby is a 73 y.o. male who is here today for follow up after colonoscopy. He did not have any problems after his colonoscopy. He denies any GI problems at this time. No GI bleeding.      2023  The patient denies recent change in bowel habits. There is no diarrhea or constipation. There is no history of abdominal pain. There is no history of overt GI bleed (hematemesis melena or hematochezia). The patient denies nausea or vomiting. There is no history of reflux. The patient denies dysphagia or odynophagia. There is no history of recent significant weight loss. There is no history of liver disease in the past. There is no family history of GI malignancy. The patient's last colonoscopy was in 2017 with polyps removed.     Subjective      Past Medical History:   Diagnosis Date    Cataracts, bilateral     Colon polyp 2017    Enlarged prostate     Hard of hearing     Hyperlipidemia     Hypertension     Seasonal allergies     Tinnitus      Past Surgical History:   Procedure Laterality Date    ACHILLES TENDON REPAIR Right     Dr. Jesus Hale - [place]    CATARACT EXTRACTION Bilateral 2022    COLONOSCOPY  2017    COLONOSCOPY N/A 2023    Procedure: COLONOSCOPY with polypectomy;  Surgeon: Williams Pierce MD;  Location: [facility] ENDOSCOPY;  Service: Gastroenterology;  Laterality: N/A;    INGUINAL HERNIA REPAIR Right     at 5 years old    KNEE  ARTHROSCOPY Right 1994    Dr. Jesus Hale - [place]    TONSILLECTOMY      childhood     Family History   Problem Relation Age of Onset    Peripheral vascular disease Mother     Heart failure Father     Colon cancer Neg Hx      Social History     Socioeconomic History    Marital status: Single   Tobacco Use    Smoking status: Never    Smokeless tobacco: Never   Vaping Use    Vaping status: Never Used   Substance and Sexual Activity    Alcohol use: Yes     Comment: SOCIAL    Drug use: Never    Sexual activity: Defer       Current Outpatient Medications:     atorvastatin (LIPITOR) 80 MG tablet, Take 1 tablet by mouth every night at bedtime., Disp: , Rfl:     coenzyme Q10 100 MG capsule, Take 1 capsule by mouth Daily., Disp: , Rfl:     lisinopril (PRINIVIL,ZESTRIL) 10 MG tablet, Take 1.5 tablets by mouth Daily., Disp: , Rfl:     ondansetron ODT (ZOFRAN-ODT) 4 MG disintegrating tablet, Place 1 tablet on the tongue Every 8 (Eight) Hours As Needed for Nausea., Disp: 12 tablet, Rfl: 0    bisacodyl (DULCOLAX) 5 MG EC tablet, Take as directed for colon prep, Disp: 4 tablet, Rfl: 0    PEG-KCl-NaCl-NaSulf-Na Asc-C (Plenvu) 140 g reconstituted solution solution, Take 140 g by mouth 1 (One) Time for 1 dose., Disp: 1 each, Rfl: 0    No Known Allergies    The following portions of the patient's history were reviewed and updated as appropriate: allergies, current medications, past family history, past medical history, past social history, past surgical history and problem list.  Objective     Physical Exam  Vitals and nursing note reviewed.   Constitutional:       General: He is not in acute distress.     Appearance: Normal appearance. He is well-developed.   HENT:      Head: Normocephalic and atraumatic.      Mouth/Throat:      Mouth: Mucous membranes are not pale, not dry and not cyanotic.   Eyes:      General: Lids are normal.   Neck:      Trachea: Trachea normal.   Cardiovascular:      Rate and Rhythm: Normal rate.  "  Pulmonary:      Effort: Pulmonary effort is normal. No respiratory distress.      Breath sounds: Normal breath sounds.   Abdominal:      Tenderness: There is no abdominal tenderness.   Skin:     General: Skin is warm and dry.   Neurological:      Mental Status: He is alert and oriented to person, place, and time.   Psychiatric:         Mood and Affect: Mood normal.         Speech: Speech normal.         Behavior: Behavior normal. Behavior is cooperative.       Vitals:    06/19/24 1628   BP: 138/90   Pulse: 50   Resp: 12   SpO2: 98%   Weight: 74.8 kg (165 lb)   Height: 175.3 cm (69\")     Body mass index is 24.37 kg/m².     Results Review:   I reviewed the patient's new clinical results.    No visits with results within 90 Day(s) from this visit.   Latest known visit with results is:   Admission on 12/01/2023, Discharged on 12/01/2023   Component Date Value Ref Range Status    Glucose 12/01/2023 168 (H)  65 - 99 mg/dL Final    BUN 12/01/2023 22  8 - 23 mg/dL Final    Creatinine 12/01/2023 1.02  0.76 - 1.27 mg/dL Final    Sodium 12/01/2023 137  136 - 145 mmol/L Final    Potassium 12/01/2023 4.1  3.5 - 5.2 mmol/L Final    Slight hemolysis detected by analyzer. Result may be falsely elevated.    Chloride 12/01/2023 102  98 - 107 mmol/L Final    CO2 12/01/2023 22.6  22.0 - 29.0 mmol/L Final    Calcium 12/01/2023 9.6  8.6 - 10.5 mg/dL Final    Total Protein 12/01/2023 6.6  6.0 - 8.5 g/dL Final    Albumin 12/01/2023 4.0  3.5 - 5.2 g/dL Final    ALT (SGPT) 12/01/2023 35  1 - 41 U/L Final    AST (SGOT) 12/01/2023 23  1 - 40 U/L Final    Alkaline Phosphatase 12/01/2023 70  39 - 117 U/L Final    Total Bilirubin 12/01/2023 0.3  0.0 - 1.2 mg/dL Final    Globulin 12/01/2023 2.6  gm/dL Final    A/G Ratio 12/01/2023 1.5  g/dL Final    BUN/Creatinine Ratio 12/01/2023 21.6  7.0 - 25.0 Final    Anion Gap 12/01/2023 12.4  5.0 - 15.0 mmol/L Final    eGFR 12/01/2023 78.1  >60.0 mL/min/1.73 Final    Magnesium 12/01/2023 2.0  1.6 - 2.4 " mg/dL Final    HS Troponin T 12/01/2023 12  <22 ng/L Final    WBC 12/01/2023 8.93  3.40 - 10.80 10*3/mm3 Final    RBC 12/01/2023 4.36  4.14 - 5.80 10*6/mm3 Final    Hemoglobin 12/01/2023 14.4  13.0 - 17.7 g/dL Final    Hematocrit 12/01/2023 41.8  37.5 - 51.0 % Final    MCV 12/01/2023 95.9  79.0 - 97.0 fL Final    MCH 12/01/2023 33.0  26.6 - 33.0 pg Final    MCHC 12/01/2023 34.4  31.5 - 35.7 g/dL Final    RDW 12/01/2023 13.5  12.3 - 15.4 % Final    RDW-SD 12/01/2023 48.0  37.0 - 54.0 fl Final    MPV 12/01/2023 8.8  6.0 - 12.0 fL Final    Platelets 12/01/2023 204  140 - 450 10*3/mm3 Final    COVID19 12/01/2023 Not Detected  Not Detected - Ref. Range Final    Influenza A PCR 12/01/2023 Not Detected  Not Detected Final    Influenza B PCR 12/01/2023 Not Detected  Not Detected Final      No radiology results for the last 90 days.     Colonoscopy dated 6/12/2023 per Dr. Pierce  - Preparation of the colon was fair.  - Sub optimal rt colon prep  - One 6 mm polyp in the cecum, removed with a cold snare. Resected and retrieved.  - Two 3-4 mm polyps in the cecum, removed with a cold snare. Resected and retrieved.  - Three 3 to 4mm polyps in the proximal ascending colon, in the mid ascending colon and in the distal ascending colon, removed with a cold snare. Resected and retrieved.  - One 8 mm polyp in the transverse colon, removed with a hot snare. Resected and retrieved.  - Three 4 to 5 mm polyps in the proximal transverse colon and in the mid transverse colon, removed with a hot snare. Resected and retrieved.  - One 3 mm polyp in the proximal descending colon, removed with a cold snare. Resected and retrieved.  - One 6 to 8 mm polyp in the proximal descending colon, removed with a hot snare. Resected and retrieved.  - One 4 mm polyp in the sigmoid colon, removed with a cold snare. Resected and retrieved.  - Non-bleeding external hemorrhoids.  A.   SIGMOID COLON POLYP:  Digestive debris.  No viable tissue for histologic  evaluation.  B.   CECUM POLYP, X3:  Multiple portions of tubular adenoma(s); negative for high-grade dysplasia.  C.   ASCENDING COLON POLYP, X3:  Multiple portions of tubular adenoma(s); negative for high-grade dysplasia.  D.   TRANSVERSE COLON POLYP, X4:  Tubular adenoma; negative for high-grade dysplasia.  E.   DESCENDING COLON POLYP, X2:  Multiple portions of tubular adenoma(s); negative for high-grade dysplasia.  Hyperplastic polyp.  F.   RECTAL POLYP:  Hyperplastic polyp.     Assessment / Plan      1. Personal history of colonic polyps  Colonoscopy dated 6/12/2023 with suboptimal right colon prep.  Multiple polyps removed, multiple tubular adenomas without dysplasia.  There is no family history of colon cancer.  Colonoscopy for surveillance    - bisacodyl (DULCOLAX) 5 MG EC tablet; Take as directed for colon prep  Dispense: 4 tablet; Refill: 0  - Case Request    Patient Instructions   High fiber, low fat diet with liberal water intake.  Colonoscopy: The indications, technique, alternatives and potential risk and complications were discussed with the patient including but not limited to bleeding, perforations, missing lesions and anesthetic complications. The patient understands and wishes to proceed with the procedure and has given their verbal consent. Written patient education information was given to the patient.   The patient will call if they have further questions before procedure.     Gina Brennan, APRN  6/19/2024    Please note that portions of this note were completed with a voice recognition program.

## 2024-06-19 NOTE — PATIENT INSTRUCTIONS
High fiber, low fat diet with liberal water intake.  Colonoscopy: The indications, technique, alternatives and potential risk and complications were discussed with the patient including but not limited to bleeding, perforations, missing lesions and anesthetic complications. The patient understands and wishes to proceed with the procedure and has given their verbal consent. Written patient education information was given to the patient.   The patient will call if they have further questions before procedure.

## 2024-06-21 PROBLEM — Z86.0100 PERSONAL HISTORY OF COLONIC POLYPS: Status: ACTIVE | Noted: 2024-06-19

## 2024-06-21 PROBLEM — Z86.010 PERSONAL HISTORY OF COLONIC POLYPS: Status: ACTIVE | Noted: 2024-06-19

## 2024-08-20 ENCOUNTER — TELEPHONE (OUTPATIENT)
Dept: GASTROENTEROLOGY | Facility: CLINIC | Age: 74
End: 2024-08-20
Payer: MEDICARE

## 2024-08-20 NOTE — TELEPHONE ENCOUNTER
Called patient re: confirmation of appt for procedure on 08/26/24.   Left voicemail for patient to call back to confirm.  Confirmation can be left with the H[facility].

## 2024-08-26 ENCOUNTER — ANESTHESIA EVENT (OUTPATIENT)
Dept: GASTROENTEROLOGY | Facility: HOSPITAL | Age: 74
End: 2024-08-26
Payer: MEDICARE

## 2024-08-26 ENCOUNTER — ANESTHESIA (OUTPATIENT)
Dept: GASTROENTEROLOGY | Facility: HOSPITAL | Age: 74
End: 2024-08-26
Payer: MEDICARE

## 2024-08-26 ENCOUNTER — HOSPITAL ENCOUNTER (OUTPATIENT)
Facility: HOSPITAL | Age: 74
Setting detail: HOSPITAL OUTPATIENT SURGERY
Discharge: HOME | End: 2024-08-26
Attending: INTERNAL MEDICINE | Admitting: INTERNAL MEDICINE
Payer: MEDICARE

## 2024-08-26 VITALS
HEART RATE: 58 BPM | OXYGEN SATURATION: 99 % | DIASTOLIC BLOOD PRESSURE: 86 MMHG | HEIGHT: 69 IN | RESPIRATION RATE: 16 BRPM | TEMPERATURE: 98.8 F | BODY MASS INDEX: 24.44 KG/M2 | SYSTOLIC BLOOD PRESSURE: 150 MMHG | WEIGHT: 165 LBS

## 2024-08-26 DIAGNOSIS — Z86.010 PERSONAL HISTORY OF COLONIC POLYPS: ICD-10-CM

## 2024-08-26 PROCEDURE — 25810000003 SODIUM CHLORIDE 0.9 % SOLUTION: Performed by: NURSE ANESTHETIST, CERTIFIED REGISTERED

## 2024-08-26 PROCEDURE — 25810000003 SODIUM CHLORIDE 0.9 % SOLUTION: Performed by: NURSE PRACTITIONER

## 2024-08-26 PROCEDURE — 88305 TISSUE EXAM BY PATHOLOGIST: CPT

## 2024-08-26 PROCEDURE — 25010000002 PROPOFOL 10 MG/ML EMULSION: Performed by: NURSE ANESTHETIST, CERTIFIED REGISTERED

## 2024-08-26 RX ORDER — SODIUM CHLORIDE 9 MG/ML
INJECTION, SOLUTION INTRAVENOUS CONTINUOUS PRN
Status: DISCONTINUED | OUTPATIENT
Start: 2024-08-26 | End: 2024-08-26 | Stop reason: SURG

## 2024-08-26 RX ORDER — PROPOFOL 10 MG/ML
VIAL (ML) INTRAVENOUS CONTINUOUS PRN
Status: DISCONTINUED | OUTPATIENT
Start: 2024-08-26 | End: 2024-08-26 | Stop reason: SURG

## 2024-08-26 RX ORDER — SODIUM CHLORIDE 9 MG/ML
70 INJECTION, SOLUTION INTRAVENOUS CONTINUOUS PRN
Status: DISCONTINUED | OUTPATIENT
Start: 2024-08-26 | End: 2024-08-26 | Stop reason: HOSPADM

## 2024-08-26 RX ORDER — SIMETHICONE 40MG/0.6ML
SUSPENSION, DROPS(FINAL DOSAGE FORM)(ML) ORAL AS NEEDED
Status: DISCONTINUED | OUTPATIENT
Start: 2024-08-26 | End: 2024-08-26 | Stop reason: HOSPADM

## 2024-08-26 RX ADMIN — LIDOCAINE HYDROCHLORIDE 60 MG: 20 INJECTION, SOLUTION INTRAVENOUS at 09:29

## 2024-08-26 RX ADMIN — PROPOFOL 200 MG: 10 INJECTION, EMULSION INTRAVENOUS at 09:29

## 2024-08-26 RX ADMIN — SODIUM CHLORIDE: 9 INJECTION, SOLUTION INTRAVENOUS at 09:23

## 2024-08-26 RX ADMIN — SODIUM CHLORIDE 70 ML/HR: 9 INJECTION, SOLUTION INTRAVENOUS at 08:36

## 2024-08-26 NOTE — DISCHARGE INSTRUCTIONS
Rest today  No pushing,pulling,tugging,heavy lifting, or strenuous activity   No major decision making,driving,or drinking alcoholic beverages for 24 hours due to the sedation you received  Always use good hand hygiene/washing technique  No driving on pain medication.    To assist you in voiding:  Drink plenty of fluids  Listen to running water while attempting to void.    If you are unable to urinate and you have an uncomfortable urge to void or it has been   6 hours since you were discharged, return to the Emergency Room.    - Discharge patient to home (ambulatory).   - High fiber diet.   - Continue present medications.   - Await pathology results.   - Repeat colonoscopy in 5 years for surveillance.   - To call for path report in 1-2 weeks   - Return to GI office PRN.

## 2024-08-26 NOTE — ANESTHESIA PREPROCEDURE EVALUATION
Anesthesia Evaluation     Patient summary reviewed and Nursing notes reviewed   NPO Solid Status: > 8 hours  NPO Liquid Status: > 8 hours           Airway   Mallampati: II  TM distance: >3 FB  Neck ROM: full  Possible difficult intubation  Dental - normal exam     Pulmonary - negative pulmonary ROS and normal exam   Cardiovascular - normal exam    ECG reviewed    (+) hypertension, valvular problems/murmurs murmur, hyperlipidemia      Neuro/Psych- negative ROS  GI/Hepatic/Renal/Endo - negative ROS     Musculoskeletal     Abdominal    Substance History   (+) alcohol use     OB/GYN          Other   arthritis,                   Anesthesia Plan    ASA 2     MAC     (Risks and benefits discussed including risk of aspiration, recall and dental damage. All patient questions answered.    Will continue with plan of care.)  intravenous induction     Anesthetic plan, risks, benefits, and alternatives have been provided, discussed and informed consent has been obtained with: patient.  Pre-procedure education provided  Plan discussed with CRNA.    CODE STATUS:

## 2024-08-26 NOTE — H&P
[facility]  HISTORY AND PHYSICAL    Patient Name: Dave Ruby  : 1950  MRN: [id number]    Chief Complaint:   For surveillance colonoscopy    History Of Presenting Illness:    Multiple colon polyps removed in 2023 > 10 TAs    Past Medical History:   Diagnosis Date    Arthritis     Cataracts, bilateral     s/p surgery    Colon polyp     Enlarged prostate     Hard of hearing     bilateral hearing aids    Heart murmur     History of exercise stress test     Hyperlipidemia     Hypertension     Seasonal allergies     Tinnitus        Past Surgical History:   Procedure Laterality Date    ACHILLES TENDON REPAIR Right     Dr. Jesus Muhammad [place]    CATARACT EXTRACTION Bilateral 2022    COLONOSCOPY  2017    COLONOSCOPY N/A 2023    Procedure: COLONOSCOPY with polypectomy;  Surgeon: Williams Pierce MD;  Location: [facility] ENDOSCOPY;  Service: Gastroenterology;  Laterality: N/A;    INGUINAL HERNIA REPAIR Right     at 5 years old    KNEE ARTHROSCOPY Right     Dr. Jesus Muhammad [place]    TONSILLECTOMY      WISDOM TOOTH EXTRACTION         Social History     Socioeconomic History    Marital status: Single   Tobacco Use    Smoking status: Never    Smokeless tobacco: Never   Vaping Use    Vaping status: Never Used   Substance and Sexual Activity    Alcohol use: Yes     Comment: SOCIAL    Drug use: Never    Sexual activity: Defer       Family History   Problem Relation Age of Onset    Peripheral vascular disease Mother     Heart failure Father     Colon cancer Neg Hx        Prior to Admission Medications:  Medications Prior to Admission   Medication Sig Dispense Refill Last Dose    atorvastatin (LIPITOR) 80 MG tablet Take 1 tablet by mouth every night at bedtime.   2024    bisacodyl (DULCOLAX) 5 MG EC tablet Take as directed for colon prep 4 tablet 0 2024    coenzyme Q10 100 MG capsule Take 1 capsule by mouth Daily.   2024    lisinopril  (PRINIVIL,ZESTRIL) 10 MG tablet Take 1.5 tablets by mouth Daily.   8/24/2024    ondansetron ODT (ZOFRAN-ODT) 4 MG disintegrating tablet Place 1 tablet on the tongue Every 8 (Eight) Hours As Needed for Nausea. 12 tablet 0 8/24/2024       Allergies:  No Known Allergies     Vitals: Temp:  [97 °F (36.1 °C)] 97 °F (36.1 °C)  Heart Rate:  [74] 74  Resp:  [15] 15  BP: (144)/(97) 144/97    Review Of Systems:  Constitutional:  Negative for chills, fever, and unexpected weight change.  Respiratory:  Negative for cough, chest tightness, shortness of breath, and wheezing.  Cardiovascular:  Negative for chest pain, palpitations, and leg swelling.  Gastrointestinal:  Negative for abdominal distention, abdominal pain, nausea, vomiting.  Neurological:  Negative for weakness, numbness, and headaches.     Physical Exam:    General Appearance:  Alert, cooperative, in no acute distress.   Lungs:   Clear to auscultation, respirations regular, even and                 unlabored.   Heart:  Regular rhythm and normal rate.   Abdomen:   Normal bowel sounds, no masses, no organomegaly. Soft, nontender, nondistended   Neurologic: Alert and oriented x 3. Moves all four limbs equally       Assessment & Plan     Assessment:  Principal Problem:    Personal history of colonic polyps      Plan: COLONOSCOPY (N/A)     Williams Pierce MD  8/26/2024

## 2024-08-26 NOTE — ANESTHESIA POSTPROCEDURE EVALUATION
Patient: Dave Ruby    Procedure Summary       Date: 08/26/24 Room / Location: [facility] ENDOSCOPY 2 / [facility] ENDOSCOPY    Anesthesia Start: 0923 Anesthesia Stop: 0949    Procedure: COLONOSCOPY WITH POLYPECTOMY (Anus) Diagnosis:       Personal history of colonic polyps      (Personal history of colonic polyps [Z86.010])    Surgeons: Williams Pierce MD Provider: Mack Olguin CRNA    Anesthesia Type: MAC ASA Status: 2            Anesthesia Type: MAC    Vitals  Vitals Value Taken Time   /70 08/26/24 0950   Temp 98.8 °F (37.1 °C) 08/26/24 0950   Pulse 58 08/26/24 0950   Resp 16 08/26/24 0950   SpO2 98 % 08/26/24 1008   Vitals shown include unfiled device data.          Post Anesthesia Care and Evaluation    Patient location during evaluation: PHASE II  Patient participation: complete - patient participated  Level of consciousness: awake  Pain score: 1  Pain management: adequate    Airway patency: patent  Anesthetic complications: No anesthetic complications  PONV Status: controlled  Cardiovascular status: acceptable and stable  Respiratory status: acceptable  Hydration status: acceptable    Comments: See Nursing record for post procedural Vital Signs as per protocol.

## 2024-08-27 LAB — REF LAB TEST METHOD: NORMAL

## 2024-08-30 ENCOUNTER — TELEPHONE (OUTPATIENT)
Dept: GASTROENTEROLOGY | Facility: CLINIC | Age: 74
End: 2024-08-30
Payer: MEDICARE

## 2024-08-30 NOTE — TELEPHONE ENCOUNTER
----- Message from Akua LANGFORD sent at 8/29/2024  9:59 AM EDT -----  Regarding: RE: Colon pathology  Lm for patient to call back.  ----- Message -----  From: Akua Galeas MA  Sent: 8/29/2024   9:41 AM EDT  To: Akua Galeas MA  Subject: FW: Colon pathology                                ----- Message -----  From: Gina Brennan APRN  Sent: 8/29/2024   8:41 AM EDT  To: Fidelina Menjivar MA  Subject: FW: Colon pathology                              Let him know the polyps were tubular adenoma, but no dysplasia. He needs to have a colonoscopy for surveillance in 5 years, August 2029. A recall has been put in.  ----- Message -----  From: Nu Cruz RegSched Rep  Sent: 8/26/2024  10:08 AM EDT  To: LIZANDRO Lopez  Subject: Colon pathology                                  Patient had colonoscopy today, 08/26/24. He was told pathology would be called to him and to follow-up prn.  I have entered a 5 year colon recall.

## 2024-09-05 ENCOUNTER — TELEPHONE (OUTPATIENT)
Dept: GASTROENTEROLOGY | Facility: CLINIC | Age: 74
End: 2024-09-05
Payer: MEDICARE

## 2024-09-05 NOTE — TELEPHONE ENCOUNTER
Caller: BALBIR GLEZ    Relationship: SELF    Best call back number: [phone]     What is the best time to reach you: ANYTIME    Who are you requesting to speak with (clinical staff, provider,  specific staff member): WALDEMAR    Do you know the name of the person who called: WALDEMAR LONG    What was the call regarding: UNKNOWN    Is it okay if the provider responds through MyChart: YES

## 2024-09-06 ENCOUNTER — TELEPHONE (OUTPATIENT)
Dept: GASTROENTEROLOGY | Facility: CLINIC | Age: 74
End: 2024-09-06
Payer: MEDICARE

## 2024-09-06 NOTE — TELEPHONE ENCOUNTER
I have attempted to call patient, and another staff member has tried to reach patient by phone to give him colonoscopy results.  He has not answered, today sent [employer] message to him with the results and also left him a vm today stating that I had not been able to reach him so he could log in to [employer] to get his results.    Total phone calls: 3  [facility] Information[facility] message: 1

## (undated) DEVICE — ENDOSCOPY PORT CONNECTOR FOR OLYMPUS® SCOPES: Brand: ERBE

## (undated) DEVICE — VLV SXN AIR/H2O ORCAPOD3 1P/U STRL

## (undated) DEVICE — SINGLE-USE POLYPECTOMY SNARE: Brand: CAPTIVATOR II

## (undated) DEVICE — LUBE JELLY PK/2.75GM STRL BX/144

## (undated) DEVICE — HYBRID CO2 TUBING/CAP SET FOR OLYMPUS® SCOPES & CO2 SOURCE: Brand: ERBE

## (undated) DEVICE — Device

## (undated) DEVICE — HYBRID TUBING/CAP SET FOR OLYMPUS® SCOPES: Brand: ERBE

## (undated) DEVICE — QUICK CATCH IN-LINE SUCTION POLYP TRAP IS USED FOR SUCTION RETRIEVAL OF ENDOSCOPICALLY REMOVED POLYPS.